# Patient Record
Sex: FEMALE | Race: OTHER | Employment: FULL TIME | URBAN - METROPOLITAN AREA
[De-identification: names, ages, dates, MRNs, and addresses within clinical notes are randomized per-mention and may not be internally consistent; named-entity substitution may affect disease eponyms.]

---

## 2018-02-21 ENCOUNTER — HOSPITAL ENCOUNTER (EMERGENCY)
Facility: HOSPITAL | Age: 28
Discharge: HOME | End: 2018-02-21
Payer: COMMERCIAL

## 2018-02-21 ENCOUNTER — APPOINTMENT (OUTPATIENT)
Dept: CT IMAGING | Facility: HOSPITAL | Age: 28
End: 2018-02-21
Attending: NURSE PRACTITIONER
Payer: COMMERCIAL

## 2018-02-21 ENCOUNTER — APPOINTMENT (OUTPATIENT)
Dept: GENERAL RADIOLOGY | Facility: HOSPITAL | Age: 28
End: 2018-02-21
Attending: NURSE PRACTITIONER
Payer: COMMERCIAL

## 2018-02-21 VITALS
HEIGHT: 63 IN | HEART RATE: 90 BPM | DIASTOLIC BLOOD PRESSURE: 70 MMHG | RESPIRATION RATE: 18 BRPM | OXYGEN SATURATION: 98 % | WEIGHT: 127 LBS | SYSTOLIC BLOOD PRESSURE: 122 MMHG | BODY MASS INDEX: 22.5 KG/M2 | TEMPERATURE: 98 F

## 2018-02-21 DIAGNOSIS — S39.012A BACK STRAIN, INITIAL ENCOUNTER: Primary | ICD-10-CM

## 2018-02-21 LAB
ANION GAP SERPL CALC-SCNC: 9 MMOL/L (ref 0–18)
B-HCG UR QL: NEGATIVE
BACTERIA UR QL AUTO: NEGATIVE /HPF
BASOPHILS # BLD: 0 K/UL (ref 0–0.2)
BASOPHILS NFR BLD: 0 %
BILIRUB UR QL: NEGATIVE
BUN SERPL-MCNC: 6 MG/DL (ref 8–20)
BUN/CREAT SERPL: 9.1 (ref 10–20)
CALCIUM SERPL-MCNC: 9.8 MG/DL (ref 8.5–10.5)
CHLORIDE SERPL-SCNC: 101 MMOL/L (ref 95–110)
CLARITY UR: CLEAR
CO2 SERPL-SCNC: 27 MMOL/L (ref 22–32)
COLOR UR: YELLOW
CREAT SERPL-MCNC: 0.66 MG/DL (ref 0.5–1.5)
D DIMER PPP FEU-MCNC: 0.35 MCG/ML (ref ?–0.5)
EOSINOPHIL # BLD: 0 K/UL (ref 0–0.7)
EOSINOPHIL NFR BLD: 0 %
ERYTHROCYTE [DISTWIDTH] IN BLOOD BY AUTOMATED COUNT: 13.1 % (ref 11–15)
GLUCOSE SERPL-MCNC: 100 MG/DL (ref 70–99)
GLUCOSE UR-MCNC: NEGATIVE MG/DL
HCT VFR BLD AUTO: 42.8 % (ref 35–48)
HGB BLD-MCNC: 14.4 G/DL (ref 12–16)
KETONES UR-MCNC: NEGATIVE MG/DL
LEUKOCYTE ESTERASE UR QL STRIP.AUTO: NEGATIVE
LYMPHOCYTES # BLD: 1.6 K/UL (ref 1–4)
LYMPHOCYTES NFR BLD: 12 %
MCH RBC QN AUTO: 30.6 PG (ref 27–32)
MCHC RBC AUTO-ENTMCNC: 33.8 G/DL (ref 32–37)
MCV RBC AUTO: 90.8 FL (ref 80–100)
MONOCYTES # BLD: 0.9 K/UL (ref 0–1)
MONOCYTES NFR BLD: 6 %
NEUTROPHILS # BLD AUTO: 11 K/UL (ref 1.8–7.7)
NEUTROPHILS NFR BLD: 82 %
NITRITE UR QL STRIP.AUTO: NEGATIVE
OSMOLALITY UR CALC.SUM OF ELEC: 282 MOSM/KG (ref 275–295)
PH UR: 7 [PH] (ref 5–8)
PLATELET # BLD AUTO: 228 K/UL (ref 140–400)
PMV BLD AUTO: 8.5 FL (ref 7.4–10.3)
POTASSIUM SERPL-SCNC: 3.3 MMOL/L (ref 3.3–5.1)
PROT UR-MCNC: NEGATIVE MG/DL
RBC # BLD AUTO: 4.71 M/UL (ref 3.7–5.4)
RBC #/AREA URNS AUTO: 61 /HPF
SODIUM SERPL-SCNC: 137 MMOL/L (ref 136–144)
SP GR UR STRIP: 1.02 (ref 1–1.03)
UROBILINOGEN UR STRIP-ACNC: <2
VIT C UR-MCNC: NEGATIVE MG/DL
WBC # BLD AUTO: 13.5 K/UL (ref 4–11)
WBC #/AREA URNS AUTO: 1 /HPF

## 2018-02-21 PROCEDURE — 85025 COMPLETE CBC W/AUTO DIFF WBC: CPT | Performed by: NURSE PRACTITIONER

## 2018-02-21 PROCEDURE — 81025 URINE PREGNANCY TEST: CPT

## 2018-02-21 PROCEDURE — 96374 THER/PROPH/DIAG INJ IV PUSH: CPT

## 2018-02-21 PROCEDURE — 93010 ELECTROCARDIOGRAM REPORT: CPT | Performed by: NURSE PRACTITIONER

## 2018-02-21 PROCEDURE — 85379 FIBRIN DEGRADATION QUANT: CPT | Performed by: NURSE PRACTITIONER

## 2018-02-21 PROCEDURE — 99285 EMERGENCY DEPT VISIT HI MDM: CPT

## 2018-02-21 PROCEDURE — 81001 URINALYSIS AUTO W/SCOPE: CPT

## 2018-02-21 PROCEDURE — 71046 X-RAY EXAM CHEST 2 VIEWS: CPT | Performed by: NURSE PRACTITIONER

## 2018-02-21 PROCEDURE — 74176 CT ABD & PELVIS W/O CONTRAST: CPT | Performed by: NURSE PRACTITIONER

## 2018-02-21 PROCEDURE — 93005 ELECTROCARDIOGRAM TRACING: CPT

## 2018-02-21 PROCEDURE — 96361 HYDRATE IV INFUSION ADD-ON: CPT

## 2018-02-21 PROCEDURE — 80048 BASIC METABOLIC PNL TOTAL CA: CPT | Performed by: NURSE PRACTITIONER

## 2018-02-21 RX ORDER — KETOROLAC TROMETHAMINE 30 MG/ML
30 INJECTION, SOLUTION INTRAMUSCULAR; INTRAVENOUS ONCE
Status: COMPLETED | OUTPATIENT
Start: 2018-02-21 | End: 2018-02-21

## 2018-02-21 RX ORDER — CYCLOBENZAPRINE HCL 10 MG
10 TABLET ORAL 3 TIMES DAILY PRN
Qty: 10 TABLET | Refills: 0 | Status: SHIPPED | OUTPATIENT
Start: 2018-02-21 | End: 2018-02-25

## 2018-02-21 NOTE — ED PROVIDER NOTES
Patient Seen in: [facility] AND [facility] Emergency Department    History   CC: flank pain  HPI: Byron Otero 28year old female  who presents to the ER c/o right-sided flank pain that was somewhat intermittent yesterday however constant and severe this mor (36.7 °C)   Resp 18   Ht 160 cm (5' 3\")   Wt 57.6 kg   SpO2 98%   BMI 22.50 kg/m²         General - Appears well and in NAD  Head - Appears symmetrical without deformity/swelling cranium, scalp, or facial bones  Resp - Lung sounds clear bilaterally and wo LAVENDER TALL (BNP)       MDM     Results discussed with patient as well as Dr. Mc Cardenas who also evaluated this patient. No effusion seen on x-ray. States that she is recovering from a recent cold however. Pain is exacerbated with some movement.   Will disc

## 2019-12-10 ENCOUNTER — LAB ENCOUNTER (OUTPATIENT)
Dept: LAB | Facility: HOSPITAL | Age: 29
End: 2019-12-10
Attending: FAMILY MEDICINE
Payer: COMMERCIAL

## 2019-12-10 DIAGNOSIS — R10.13 ABDOMINAL PAIN, EPIGASTRIC: ICD-10-CM

## 2019-12-10 DIAGNOSIS — N91.5 SCANTY OR INFREQUENT MENSTRUATION: Primary | ICD-10-CM

## 2019-12-10 PROCEDURE — 83001 ASSAY OF GONADOTROPIN (FSH): CPT

## 2019-12-10 PROCEDURE — 83002 ASSAY OF GONADOTROPIN (LH): CPT

## 2019-12-10 PROCEDURE — 36415 COLL VENOUS BLD VENIPUNCTURE: CPT

## 2019-12-10 PROCEDURE — 81001 URINALYSIS AUTO W/SCOPE: CPT

## 2019-12-10 PROCEDURE — 85025 COMPLETE CBC W/AUTO DIFF WBC: CPT

## 2019-12-10 PROCEDURE — 84703 CHORIONIC GONADOTROPIN ASSAY: CPT

## 2019-12-10 PROCEDURE — 84443 ASSAY THYROID STIM HORMONE: CPT

## 2019-12-10 PROCEDURE — 80053 COMPREHEN METABOLIC PANEL: CPT

## 2019-12-31 ENCOUNTER — HOSPITAL ENCOUNTER (OUTPATIENT)
Dept: ULTRASOUND IMAGING | Facility: HOSPITAL | Age: 29
Discharge: HOME | End: 2019-12-31
Attending: FAMILY MEDICINE
Payer: COMMERCIAL

## 2019-12-31 DIAGNOSIS — R31.21 ASYMPTOMATIC MICROSCOPIC HEMATURIA: ICD-10-CM

## 2019-12-31 PROCEDURE — 76770 US EXAM ABDO BACK WALL COMP: CPT | Performed by: FAMILY MEDICINE

## 2020-09-03 ENCOUNTER — LAB ENCOUNTER (OUTPATIENT)
Dept: LAB | Facility: HOSPITAL | Age: 30
End: 2020-09-03
Attending: FAMILY MEDICINE
Payer: COMMERCIAL

## 2020-09-03 DIAGNOSIS — Z20.828 EXPOSURE TO SARS-ASSOCIATED CORONAVIRUS: Primary | ICD-10-CM

## 2020-09-03 DIAGNOSIS — L65.9 BALDNESS: ICD-10-CM

## 2020-09-03 DIAGNOSIS — Z00.00 ROUTINE GENERAL MEDICAL EXAMINATION AT A HEALTH CARE FACILITY: ICD-10-CM

## 2020-09-03 LAB
ALBUMIN SERPL-MCNC: 3.7 G/DL (ref 3.4–5)
ALBUMIN/GLOB SERPL: 0.9 {RATIO} (ref 1–2)
ALP LIVER SERPL-CCNC: 113 U/L (ref 37–98)
ALT SERPL-CCNC: 19 U/L (ref 13–56)
ANION GAP SERPL CALC-SCNC: 4 MMOL/L (ref 0–18)
AST SERPL-CCNC: 17 U/L (ref 15–37)
BASOPHILS # BLD AUTO: 0.02 X10(3) UL (ref 0–0.2)
BASOPHILS NFR BLD AUTO: 0.4 %
BILIRUB SERPL-MCNC: 0.4 MG/DL (ref 0.1–2)
BUN BLD-MCNC: 7 MG/DL (ref 7–18)
BUN/CREAT SERPL: 11.1 (ref 10–20)
CALCIUM BLD-MCNC: 9.1 MG/DL (ref 8.5–10.1)
CHLORIDE SERPL-SCNC: 110 MMOL/L (ref 98–112)
CHOLEST SMN-MCNC: 174 MG/DL (ref ?–200)
CO2 SERPL-SCNC: 26 MMOL/L (ref 21–32)
CREAT BLD-MCNC: 0.63 MG/DL (ref 0.55–1.02)
DEPRECATED HBV CORE AB SER IA-ACNC: 44.3 NG/ML (ref 12–160)
DEPRECATED RDW RBC AUTO: 42.6 FL (ref 35.1–46.3)
EOSINOPHIL # BLD AUTO: 0.07 X10(3) UL (ref 0–0.7)
EOSINOPHIL NFR BLD AUTO: 1.4 %
ERYTHROCYTE [DISTWIDTH] IN BLOOD BY AUTOMATED COUNT: 13.1 % (ref 11–15)
GLOBULIN PLAS-MCNC: 4.1 G/DL (ref 2.8–4.4)
GLUCOSE BLD-MCNC: 82 MG/DL (ref 70–99)
HCT VFR BLD AUTO: 41.2 % (ref 35–48)
HDLC SERPL-MCNC: 53 MG/DL (ref 40–59)
HGB BLD-MCNC: 13.8 G/DL (ref 12–16)
IMM GRANULOCYTES # BLD AUTO: 0.01 X10(3) UL (ref 0–1)
IMM GRANULOCYTES NFR BLD: 0.2 %
IRON SATURATION: 21 % (ref 15–50)
IRON SERPL-MCNC: 94 UG/DL (ref 50–170)
LDLC SERPL CALC-MCNC: 99 MG/DL (ref ?–100)
LYMPHOCYTES # BLD AUTO: 1.93 X10(3) UL (ref 1–4)
LYMPHOCYTES NFR BLD AUTO: 37.5 %
M PROTEIN MFR SERPL ELPH: 7.8 G/DL (ref 6.4–8.2)
MCH RBC QN AUTO: 29.8 PG (ref 26–34)
MCHC RBC AUTO-ENTMCNC: 33.5 G/DL (ref 31–37)
MCV RBC AUTO: 89 FL (ref 80–100)
MONOCYTES # BLD AUTO: 0.41 X10(3) UL (ref 0.1–1)
MONOCYTES NFR BLD AUTO: 8 %
NEUTROPHILS # BLD AUTO: 2.7 X10 (3) UL (ref 1.5–7.7)
NEUTROPHILS # BLD AUTO: 2.7 X10(3) UL (ref 1.5–7.7)
NEUTROPHILS NFR BLD AUTO: 52.5 %
NONHDLC SERPL-MCNC: 121 MG/DL (ref ?–130)
OSMOLALITY SERPL CALC.SUM OF ELEC: 287 MOSM/KG (ref 275–295)
PATIENT FASTING Y/N/NP: YES
PATIENT FASTING Y/N/NP: YES
PLATELET # BLD AUTO: 219 10(3)UL (ref 150–450)
POTASSIUM SERPL-SCNC: 3.9 MMOL/L (ref 3.5–5.1)
RBC # BLD AUTO: 4.63 X10(6)UL (ref 3.8–5.3)
SARS-COV-2 IGG SERPLBLD QL IA.RAPID: NEGATIVE
SODIUM SERPL-SCNC: 140 MMOL/L (ref 136–145)
TOTAL IRON BINDING CAPACITY: 448 UG/DL (ref 240–450)
TRANSFERRIN SERPL-MCNC: 301 MG/DL (ref 200–360)
TRIGL SERPL-MCNC: 108 MG/DL (ref 30–149)
TSI SER-ACNC: 1.91 MIU/ML (ref 0.36–3.74)
VLDLC SERPL CALC-MCNC: 22 MG/DL (ref 0–30)
WBC # BLD AUTO: 5.1 X10(3) UL (ref 4–11)

## 2020-09-03 PROCEDURE — 82728 ASSAY OF FERRITIN: CPT

## 2020-09-03 PROCEDURE — 83540 ASSAY OF IRON: CPT

## 2020-09-03 PROCEDURE — 85025 COMPLETE CBC W/AUTO DIFF WBC: CPT

## 2020-09-03 PROCEDURE — 36415 COLL VENOUS BLD VENIPUNCTURE: CPT

## 2020-09-03 PROCEDURE — 84402 ASSAY OF FREE TESTOSTERONE: CPT

## 2020-09-03 PROCEDURE — 84443 ASSAY THYROID STIM HORMONE: CPT

## 2020-09-03 PROCEDURE — 86769 SARS-COV-2 COVID-19 ANTIBODY: CPT

## 2020-09-03 PROCEDURE — 80061 LIPID PANEL: CPT

## 2020-09-03 PROCEDURE — 80053 COMPREHEN METABOLIC PANEL: CPT

## 2020-09-03 PROCEDURE — 84403 ASSAY OF TOTAL TESTOSTERONE: CPT

## 2020-09-03 PROCEDURE — 84466 ASSAY OF TRANSFERRIN: CPT

## 2020-09-05 LAB
TESTOSTERONE, FREE, S: 0.88 NG/DL
TESTOSTERONE, TOTAL, S: 42 NG/DL

## 2021-04-30 ENCOUNTER — OFFICE VISIT (OUTPATIENT)
Dept: SURGERY | Facility: CLINIC | Age: 31
End: 2021-04-30
Payer: COMMERCIAL

## 2021-04-30 DIAGNOSIS — M95.0 NASAL DEFORMITY: Primary | ICD-10-CM

## 2021-04-30 DIAGNOSIS — J34.89 NASAL OBSTRUCTION: ICD-10-CM

## 2021-04-30 PROCEDURE — 99243 OFF/OP CNSLTJ NEW/EST LOW 30: CPT | Performed by: SURGERY

## 2021-04-30 NOTE — CONSULTS
New Patient Consultation    Chief Complaint:  No chief complaint on file. Nasal obstruction nasal deformity    History of Present Illness:   Byorn Otero is a 31year old female with difficulty breathing.  She had a h o nasal trauma several years ago hoarseness, sore throat, or swollen glands  Respiratory:   The patient denies shortness of breath, cough, bloody cough, phlegm, asthma, or wheezing  Cardiovascular:   The patient denies chest pain/pressure, palpitations, irregular heartbeat, high blood pres oriented and well-developed. Neurologic: Speech patterns and movements are normal.     Psychiatric: Affect is appropriate. Eyes: Conjunctiva are clear, non-icteric.  PERRL    ENT: no obvious abnormality, no ear drainage, mucous membranes moist and pi nasal deformity, , risk of hematoma, seroma, foreign body reaction, allergic reaction, wound dehiscences, delayed wound healing, need for further surgery.       45 minutes were spent with the patient, from which 35 minutes were spent counseling the patient

## 2021-05-04 ENCOUNTER — OFFICE VISIT (OUTPATIENT)
Dept: AUDIOLOGY | Facility: CLINIC | Age: 31
End: 2021-05-04
Payer: COMMERCIAL

## 2021-05-04 ENCOUNTER — OFFICE VISIT (OUTPATIENT)
Dept: OTOLARYNGOLOGY | Facility: CLINIC | Age: 31
End: 2021-05-04
Payer: COMMERCIAL

## 2021-05-04 VITALS
HEART RATE: 112 BPM | HEIGHT: 63 IN | TEMPERATURE: 96 F | WEIGHT: 135 LBS | BODY MASS INDEX: 23.92 KG/M2 | DIASTOLIC BLOOD PRESSURE: 83 MMHG | SYSTOLIC BLOOD PRESSURE: 125 MMHG

## 2021-05-04 DIAGNOSIS — H93.19 TINNITUS, UNSPECIFIED LATERALITY: Primary | ICD-10-CM

## 2021-05-04 PROCEDURE — 92557 COMPREHENSIVE HEARING TEST: CPT | Performed by: AUDIOLOGIST

## 2021-05-04 PROCEDURE — 99203 OFFICE O/P NEW LOW 30 MIN: CPT | Performed by: OTOLARYNGOLOGY

## 2021-05-04 PROCEDURE — 3008F BODY MASS INDEX DOCD: CPT | Performed by: OTOLARYNGOLOGY

## 2021-05-04 PROCEDURE — 3079F DIAST BP 80-89 MM HG: CPT | Performed by: OTOLARYNGOLOGY

## 2021-05-04 PROCEDURE — 3074F SYST BP LT 130 MM HG: CPT | Performed by: OTOLARYNGOLOGY

## 2021-05-04 PROCEDURE — 92567 TYMPANOMETRY: CPT | Performed by: AUDIOLOGIST

## 2021-05-04 RX ORDER — MONTELUKAST SODIUM 10 MG/1
10 TABLET ORAL NIGHTLY
Qty: 30 TABLET | Refills: 3 | Status: SHIPPED | OUTPATIENT
Start: 2021-05-04 | End: 2021-09-13

## 2021-05-04 RX ORDER — AZELASTINE 1 MG/ML
2 SPRAY, METERED NASAL 2 TIMES DAILY
Qty: 1 BOTTLE | Refills: 0 | Status: SHIPPED | OUTPATIENT
Start: 2021-05-04 | End: 2021-09-13

## 2021-05-04 RX ORDER — LORATADINE 10 MG/1
10 TABLET ORAL DAILY
Qty: 30 TABLET | Refills: 3 | Status: SHIPPED | OUTPATIENT
Start: 2021-05-04 | End: 2021-09-13

## 2021-05-04 NOTE — PROGRESS NOTES
Lynne Guerrero is a 31year old female.   Patient presents with:  Sinus Problem: sinus pressure and pain , over the bridge of nose, behind eye, per inury to nose 10 years ago   Ear Problem: riging in ears 2-3 times a weeks, per pt when she eats spicy food Cuff Size: large)   Pulse 112   Temp (!) 96.3 °F (35.7 °C) (Tympanic)   Ht 5' 3\" (1.6 m)   Wt 135 lb (61.2 kg)   BMI 23.91 kg/m²        Constitutional Normal Overall appearance - Normal.   Psychiatric Normal Orientation - Oriented to time, place, person & still exist    Sharath Araujo MD    5/4/2021    1:32 PM

## 2021-05-05 NOTE — PROGRESS NOTES
AUDIOGRAM     Nai Chu was referred for testing by Philmore Dakin for bilateral tinnitus. 9/12/1990  [id number]      Otoscopic inspection: right ear no cerumen; left ear no cerumen.        Tests/Procedures  Patient was tested via  standard insert e

## 2021-09-13 ENCOUNTER — OFFICE VISIT (OUTPATIENT)
Dept: FAMILY MEDICINE CLINIC | Facility: CLINIC | Age: 31
End: 2021-09-13
Payer: COMMERCIAL

## 2021-09-13 ENCOUNTER — LAB ENCOUNTER (OUTPATIENT)
Dept: LAB | Age: 31
End: 2021-09-13
Attending: FAMILY MEDICINE
Payer: COMMERCIAL

## 2021-09-13 VITALS
WEIGHT: 138 LBS | HEIGHT: 63 IN | HEART RATE: 71 BPM | DIASTOLIC BLOOD PRESSURE: 76 MMHG | BODY MASS INDEX: 24.45 KG/M2 | SYSTOLIC BLOOD PRESSURE: 108 MMHG

## 2021-09-13 DIAGNOSIS — Z13.0 SCREENING FOR DEFICIENCY ANEMIA: ICD-10-CM

## 2021-09-13 DIAGNOSIS — Z00.00 WELLNESS EXAMINATION: Primary | ICD-10-CM

## 2021-09-13 DIAGNOSIS — J34.2 NASAL SEPTAL DEVIATION: ICD-10-CM

## 2021-09-13 DIAGNOSIS — Z13.29 THYROID DISORDER SCREEN: ICD-10-CM

## 2021-09-13 DIAGNOSIS — Z00.00 WELLNESS EXAMINATION: ICD-10-CM

## 2021-09-13 DIAGNOSIS — R53.82 CHRONIC FATIGUE: ICD-10-CM

## 2021-09-13 DIAGNOSIS — G47.9 SLEEP DISORDER: ICD-10-CM

## 2021-09-13 LAB
ALBUMIN SERPL-MCNC: 4.3 G/DL (ref 3.4–5)
ALBUMIN/GLOB SERPL: 1.2 {RATIO} (ref 1–2)
ALP LIVER SERPL-CCNC: 111 U/L
ALT SERPL-CCNC: 22 U/L
ANION GAP SERPL CALC-SCNC: 6 MMOL/L (ref 0–18)
AST SERPL-CCNC: 15 U/L (ref 15–37)
BASOPHILS # BLD AUTO: 0.03 X10(3) UL (ref 0–0.2)
BASOPHILS NFR BLD AUTO: 0.5 %
BILIRUB SERPL-MCNC: 0.2 MG/DL (ref 0.1–2)
BUN BLD-MCNC: 8 MG/DL (ref 7–18)
BUN/CREAT SERPL: 12.3 (ref 10–20)
CALCIUM BLD-MCNC: 9.3 MG/DL (ref 8.5–10.1)
CHLORIDE SERPL-SCNC: 104 MMOL/L (ref 98–112)
CO2 SERPL-SCNC: 29 MMOL/L (ref 21–32)
CREAT BLD-MCNC: 0.65 MG/DL
DEPRECATED RDW RBC AUTO: 42.7 FL (ref 35.1–46.3)
EOSINOPHIL # BLD AUTO: 0.08 X10(3) UL (ref 0–0.7)
EOSINOPHIL NFR BLD AUTO: 1.3 %
ERYTHROCYTE [DISTWIDTH] IN BLOOD BY AUTOMATED COUNT: 12.7 % (ref 11–15)
GLOBULIN PLAS-MCNC: 3.6 G/DL (ref 2.8–4.4)
GLUCOSE BLD-MCNC: 90 MG/DL (ref 70–99)
HCT VFR BLD AUTO: 40 %
HGB BLD-MCNC: 13.1 G/DL
IMM GRANULOCYTES # BLD AUTO: 0.02 X10(3) UL (ref 0–1)
IMM GRANULOCYTES NFR BLD: 0.3 %
LYMPHOCYTES # BLD AUTO: 2.25 X10(3) UL (ref 1–4)
LYMPHOCYTES NFR BLD AUTO: 35.9 %
M PROTEIN MFR SERPL ELPH: 7.9 G/DL (ref 6.4–8.2)
MCH RBC QN AUTO: 30 PG (ref 26–34)
MCHC RBC AUTO-ENTMCNC: 32.8 G/DL (ref 31–37)
MCV RBC AUTO: 91.7 FL
MONOCYTES # BLD AUTO: 0.48 X10(3) UL (ref 0.1–1)
MONOCYTES NFR BLD AUTO: 7.7 %
NEUTROPHILS # BLD AUTO: 3.41 X10 (3) UL (ref 1.5–7.7)
NEUTROPHILS # BLD AUTO: 3.41 X10(3) UL (ref 1.5–7.7)
NEUTROPHILS NFR BLD AUTO: 54.3 %
OSMOLALITY SERPL CALC.SUM OF ELEC: 286 MOSM/KG (ref 275–295)
PATIENT FASTING Y/N/NP: NO
PLATELET # BLD AUTO: 235 10(3)UL (ref 150–450)
POTASSIUM SERPL-SCNC: 3.7 MMOL/L (ref 3.5–5.1)
RBC # BLD AUTO: 4.36 X10(6)UL
SODIUM SERPL-SCNC: 139 MMOL/L (ref 136–145)
TSI SER-ACNC: 2.25 MIU/ML (ref 0.36–3.74)
WBC # BLD AUTO: 6.3 X10(3) UL (ref 4–11)

## 2021-09-13 PROCEDURE — 84443 ASSAY THYROID STIM HORMONE: CPT

## 2021-09-13 PROCEDURE — 3008F BODY MASS INDEX DOCD: CPT | Performed by: FAMILY MEDICINE

## 2021-09-13 PROCEDURE — 80053 COMPREHEN METABOLIC PANEL: CPT

## 2021-09-13 PROCEDURE — 85025 COMPLETE CBC W/AUTO DIFF WBC: CPT

## 2021-09-13 PROCEDURE — 99385 PREV VISIT NEW AGE 18-39: CPT | Performed by: FAMILY MEDICINE

## 2021-09-13 PROCEDURE — 3078F DIAST BP <80 MM HG: CPT | Performed by: FAMILY MEDICINE

## 2021-09-13 PROCEDURE — 36415 COLL VENOUS BLD VENIPUNCTURE: CPT

## 2021-09-13 PROCEDURE — 3074F SYST BP LT 130 MM HG: CPT | Performed by: FAMILY MEDICINE

## 2021-09-13 NOTE — PROGRESS NOTES
HPI:   Obed Smith is a 27year old female who presents for an Annual Health Visit.    Patient reports that over the last few years she has been having continued fatigue, she reports that she sleeps around 8 hours per night and she wakes up feeling f Pulmonary effort is normal.      Breath sounds: Normal breath sounds. Chest:   Breasts: Breasts are symmetrical.      Right: No swelling, inverted nipple, mass, nipple discharge, skin change, tenderness, axillary adenopathy or supraclavicular adenopathy. goal is to find a disease early so lifestyle changes can be made and you can be watched more closely to reduce the risk of disease, or to detect it early enough to treat it most effectively.  Screening tests are not considered diagnostic, but are used to de routine exams   Tuberculosis Women at increased risk for infection should talk with their healthcare provider  Ask your healthcare provider    Vision All women in this age group  At least 1 complete exam in your 25s, and 2 in your 30s    Vaccine Who needs this age group  Td every 10 years, or a one-time dose of Tdap instead of a Td booster after age 25, then Td every 10 years    Counseling Who needs it How often   BRCA gene mutation testing for breast and ovarian cancer susceptibility  Women with increased Active Problem List:     Nasal obstruction     Nasal deformity      Chanelle Spencer.  Andrés Zavala MD  9/13/2021  4:27 PM

## 2021-10-04 ENCOUNTER — OFFICE VISIT (OUTPATIENT)
Dept: FAMILY MEDICINE CLINIC | Facility: CLINIC | Age: 31
End: 2021-10-04
Payer: COMMERCIAL

## 2021-10-04 VITALS
SYSTOLIC BLOOD PRESSURE: 105 MMHG | DIASTOLIC BLOOD PRESSURE: 68 MMHG | HEIGHT: 63 IN | WEIGHT: 138.13 LBS | RESPIRATION RATE: 17 BRPM | BODY MASS INDEX: 24.47 KG/M2 | HEART RATE: 87 BPM

## 2021-10-04 DIAGNOSIS — L68.0 HIRSUTISM: Primary | ICD-10-CM

## 2021-10-04 DIAGNOSIS — L83 ACANTHOSIS NIGRICANS: ICD-10-CM

## 2021-10-04 DIAGNOSIS — R53.83 FATIGUE, UNSPECIFIED TYPE: ICD-10-CM

## 2021-10-04 DIAGNOSIS — N76.0 ACUTE VAGINITIS: ICD-10-CM

## 2021-10-04 DIAGNOSIS — N91.1 SECONDARY AMENORRHEA: ICD-10-CM

## 2021-10-04 PROCEDURE — 3008F BODY MASS INDEX DOCD: CPT | Performed by: FAMILY MEDICINE

## 2021-10-04 PROCEDURE — 99214 OFFICE O/P EST MOD 30 MIN: CPT | Performed by: FAMILY MEDICINE

## 2021-10-04 PROCEDURE — 3078F DIAST BP <80 MM HG: CPT | Performed by: FAMILY MEDICINE

## 2021-10-04 PROCEDURE — 3074F SYST BP LT 130 MM HG: CPT | Performed by: FAMILY MEDICINE

## 2021-10-04 NOTE — PROGRESS NOTES
Balaji Reid is a 27year old female.   HPI:   Patient is here to follow-up on results, she reports that she has been having vaginal irritation for the last few days, she has used over-the-counter pills externally with no improvement and is spreading of TESTOSTERONE,FREE AND TOTAL; Future  - 17-ALPHA-HYDROXYPROGESTERONE; Future  - HCG, BETA SUBUNIT (QUANT PREGNANCY TEST); Future  - PROLACTIN; Future  - FSH; Future  - ESTRADIOL; Future    2.  Secondary amenorrhea  Unclear cause of amenorrhea at this time, o

## 2021-10-07 ENCOUNTER — LAB ENCOUNTER (OUTPATIENT)
Dept: LAB | Age: 31
End: 2021-10-07
Attending: FAMILY MEDICINE
Payer: COMMERCIAL

## 2021-10-07 ENCOUNTER — PATIENT MESSAGE (OUTPATIENT)
Dept: FAMILY MEDICINE CLINIC | Facility: CLINIC | Age: 31
End: 2021-10-07

## 2021-10-07 DIAGNOSIS — N91.1 SECONDARY AMENORRHEA: ICD-10-CM

## 2021-10-07 DIAGNOSIS — L68.0 HIRSUTISM: ICD-10-CM

## 2021-10-07 DIAGNOSIS — R53.83 FATIGUE, UNSPECIFIED TYPE: ICD-10-CM

## 2021-10-07 DIAGNOSIS — L83 ACANTHOSIS NIGRICANS: ICD-10-CM

## 2021-10-07 PROCEDURE — 83525 ASSAY OF INSULIN: CPT

## 2021-10-07 PROCEDURE — 83036 HEMOGLOBIN GLYCOSYLATED A1C: CPT

## 2021-10-07 PROCEDURE — 84402 ASSAY OF FREE TESTOSTERONE: CPT

## 2021-10-07 PROCEDURE — 84403 ASSAY OF TOTAL TESTOSTERONE: CPT

## 2021-10-07 PROCEDURE — 82306 VITAMIN D 25 HYDROXY: CPT

## 2021-10-07 PROCEDURE — 83001 ASSAY OF GONADOTROPIN (FSH): CPT

## 2021-10-07 PROCEDURE — 84146 ASSAY OF PROLACTIN: CPT

## 2021-10-07 PROCEDURE — 83498 ASY HYDROXYPROGESTERONE 17-D: CPT

## 2021-10-07 PROCEDURE — 84702 CHORIONIC GONADOTROPIN TEST: CPT

## 2021-10-07 PROCEDURE — 82670 ASSAY OF TOTAL ESTRADIOL: CPT

## 2021-10-07 PROCEDURE — 36415 COLL VENOUS BLD VENIPUNCTURE: CPT

## 2021-10-07 PROCEDURE — 82947 ASSAY GLUCOSE BLOOD QUANT: CPT

## 2021-10-08 ENCOUNTER — PATIENT MESSAGE (OUTPATIENT)
Dept: FAMILY MEDICINE CLINIC | Facility: CLINIC | Age: 31
End: 2021-10-08

## 2021-10-08 RX ORDER — METRONIDAZOLE 7.5 MG/G
1 GEL VAGINAL NIGHTLY
Qty: 70 G | Refills: 0 | Status: SHIPPED | OUTPATIENT
Start: 2021-10-08 | End: 2021-10-13

## 2021-10-08 NOTE — TELEPHONE ENCOUNTER
Routed to Dr Kecia Perez for advise, thanks.       Future Appointments   Date Time Provider Massimo Delatorre   10/28/2021  2:10 PM DO RENNY [facility] DAYANARA GARDNER

## 2021-10-08 NOTE — TELEPHONE ENCOUNTER
From: Rk Montejo  To: Julieth Levine MD  Sent: 10/7/2021 12:44 PM CDT  Subject: Question regarding GENITAL VAGINOSIS SCREEN    Is there an over the counter medication that I can use?

## 2021-10-09 PROBLEM — E55.9 VITAMIN D DEFICIENCY: Status: ACTIVE | Noted: 2021-10-09

## 2021-10-21 PROBLEM — E28.2 PCOS (POLYCYSTIC OVARIAN SYNDROME): Status: ACTIVE | Noted: 2021-10-21

## 2021-10-28 ENCOUNTER — OFFICE VISIT (OUTPATIENT)
Dept: PULMONOLOGY | Facility: CLINIC | Age: 31
End: 2021-10-28
Payer: COMMERCIAL

## 2021-10-28 VITALS
DIASTOLIC BLOOD PRESSURE: 82 MMHG | BODY MASS INDEX: 25.12 KG/M2 | WEIGHT: 141.81 LBS | OXYGEN SATURATION: 97 % | HEART RATE: 79 BPM | HEIGHT: 63 IN | SYSTOLIC BLOOD PRESSURE: 120 MMHG

## 2021-10-28 DIAGNOSIS — G47.10 HYPERSOMNIA: Primary | ICD-10-CM

## 2021-10-28 PROCEDURE — 99244 OFF/OP CNSLTJ NEW/EST MOD 40: CPT | Performed by: INTERNAL MEDICINE

## 2021-10-28 PROCEDURE — 3079F DIAST BP 80-89 MM HG: CPT | Performed by: INTERNAL MEDICINE

## 2021-10-28 PROCEDURE — 3074F SYST BP LT 130 MM HG: CPT | Performed by: INTERNAL MEDICINE

## 2021-10-28 PROCEDURE — 3008F BODY MASS INDEX DOCD: CPT | Performed by: INTERNAL MEDICINE

## 2021-10-28 NOTE — H&P
Referring Physician  Lamin De La Torre. Taurus Ramsey MD    Chief Complaint  Hypersomnia    History of Present Illness  Patient is a 27-year-old female who presents to pulmonary clinic for initial visit. Admits to longstanding history of hypersomnia and fatigue. lymphadenopathy     Imaging  Chest x-ray on 2/21/2018 with no significant abnormalities    Pulmonary Function Testing  None available to review    Assessment  1. Hypersomnia    Plan  -Patient presents today for management of underlying hypersomnia.   I do

## 2021-11-18 ENCOUNTER — TELEPHONE (OUTPATIENT)
Dept: PULMONOLOGY | Facility: CLINIC | Age: 31
End: 2021-11-18

## 2021-11-18 DIAGNOSIS — G47.10 HYPERSOMNIA: Primary | ICD-10-CM

## 2021-11-18 NOTE — TELEPHONE ENCOUNTER
Prior auth for sleep study was denied by pts insur. ,  is able to order a home sleep study test, or sched a peer to peer at [phone]. Case # [id number]. Pt. Has upcoming appt. sched for 11/19/2021.

## 2021-11-19 NOTE — TELEPHONE ENCOUNTER
Order for home sleep study was placed per Dr. Dameon López. [facility] is aware of the previous. Explained unable to cancel order for diagnostic PSG since pt currently scheduled for home sleep study. She had no further concerns/questions at this time.

## 2021-11-22 ENCOUNTER — LAB ENCOUNTER (OUTPATIENT)
Dept: LAB | Age: 31
End: 2021-11-22
Attending: FAMILY MEDICINE
Payer: COMMERCIAL

## 2021-11-22 ENCOUNTER — OFFICE VISIT (OUTPATIENT)
Dept: FAMILY MEDICINE CLINIC | Facility: CLINIC | Age: 31
End: 2021-11-22
Payer: COMMERCIAL

## 2021-11-22 VITALS
RESPIRATION RATE: 17 BRPM | HEIGHT: 63 IN | WEIGHT: 141.81 LBS | SYSTOLIC BLOOD PRESSURE: 104 MMHG | HEART RATE: 93 BPM | DIASTOLIC BLOOD PRESSURE: 68 MMHG | BODY MASS INDEX: 25.12 KG/M2

## 2021-11-22 DIAGNOSIS — E28.2 PCOS (POLYCYSTIC OVARIAN SYNDROME): ICD-10-CM

## 2021-11-22 DIAGNOSIS — Z23 NEEDS FLU SHOT: ICD-10-CM

## 2021-11-22 DIAGNOSIS — E88.81 INSULIN RESISTANCE: ICD-10-CM

## 2021-11-22 DIAGNOSIS — E55.9 VITAMIN D DEFICIENCY: ICD-10-CM

## 2021-11-22 DIAGNOSIS — E55.9 VITAMIN D DEFICIENCY: Primary | ICD-10-CM

## 2021-11-22 DIAGNOSIS — L98.8 SKIN LESION OF BREAST: ICD-10-CM

## 2021-11-22 DIAGNOSIS — N63.0 BREAST MASS: ICD-10-CM

## 2021-11-22 PROCEDURE — 3078F DIAST BP <80 MM HG: CPT | Performed by: FAMILY MEDICINE

## 2021-11-22 PROCEDURE — 82306 VITAMIN D 25 HYDROXY: CPT

## 2021-11-22 PROCEDURE — 99214 OFFICE O/P EST MOD 30 MIN: CPT | Performed by: FAMILY MEDICINE

## 2021-11-22 PROCEDURE — 90471 IMMUNIZATION ADMIN: CPT | Performed by: FAMILY MEDICINE

## 2021-11-22 PROCEDURE — 90686 IIV4 VACC NO PRSV 0.5 ML IM: CPT | Performed by: FAMILY MEDICINE

## 2021-11-22 PROCEDURE — 3074F SYST BP LT 130 MM HG: CPT | Performed by: FAMILY MEDICINE

## 2021-11-22 PROCEDURE — 36415 COLL VENOUS BLD VENIPUNCTURE: CPT

## 2021-11-22 PROCEDURE — 3008F BODY MASS INDEX DOCD: CPT | Performed by: FAMILY MEDICINE

## 2021-11-22 NOTE — PROGRESS NOTES
Beth Beck is a 27year old female.   HPI:   Patient is here to follow-up in last visit, she has questions regarding the new diagnosis of insulin resistance, she also reports that over the last few months she has been noticing lesions in breast, she i With a start Metformin at this time, we discussed importance of dietary modification, decrease intake of starchy carbohydrates and increase intake of nonstarchy carbohydrates, printed information provided, we will monitor patient response.   Given to edgardo

## 2021-11-22 NOTE — PATIENT INSTRUCTIONS
Incorporate daily servings of the fruit and vegetables below, focus on incorporating at least 2 cups of these vegetables daily

## 2021-12-09 ENCOUNTER — HOSPITAL ENCOUNTER (OUTPATIENT)
Dept: MAMMOGRAPHY | Facility: HOSPITAL | Age: 31
Discharge: HOME | End: 2021-12-09
Attending: FAMILY MEDICINE
Payer: COMMERCIAL

## 2021-12-09 DIAGNOSIS — N63.0 BREAST MASS: ICD-10-CM

## 2021-12-09 DIAGNOSIS — L98.8 SKIN LESION OF BREAST: ICD-10-CM

## 2021-12-09 PROCEDURE — 77066 DX MAMMO INCL CAD BI: CPT | Performed by: FAMILY MEDICINE

## 2021-12-09 PROCEDURE — 77062 BREAST TOMOSYNTHESIS BI: CPT | Performed by: FAMILY MEDICINE

## 2022-01-31 ENCOUNTER — OFFICE VISIT (OUTPATIENT)
Dept: SLEEP CENTER | Age: 32
End: 2022-01-31
Attending: INTERNAL MEDICINE
Payer: COMMERCIAL

## 2022-01-31 DIAGNOSIS — G47.10 HYPERSOMNIA: ICD-10-CM

## 2022-01-31 PROCEDURE — 95806 SLEEP STUDY UNATT&RESP EFFT: CPT

## 2022-02-04 NOTE — PROCEDURES
[facility]  Accredited by the [employer] ([employer])    PATIENT'S NAME: Santy Wilks   ATTENDING PHYSICIAN: Long Ibarra DO   REFERRING PHYSICIAN: Camden Neely.  Omar Davis MD   PATIENT ACCOUNT #: [id number] LO RECOMMENDATIONS:    1.   Evaluate for etiologies of patient's excessive sleepiness. 2.   Screen for hypothyroidism, if not already performed. 3.   Increase total sleep time by 1 to 2 hours to evaluate for resolution of daytime sleepiness.   4.   Allow

## 2022-04-21 ENCOUNTER — OFFICE VISIT (OUTPATIENT)
Dept: FAMILY MEDICINE CLINIC | Facility: CLINIC | Age: 32
End: 2022-04-21
Payer: COMMERCIAL

## 2022-04-21 VITALS
HEIGHT: 63 IN | HEART RATE: 86 BPM | BODY MASS INDEX: 26.05 KG/M2 | WEIGHT: 147 LBS | SYSTOLIC BLOOD PRESSURE: 106 MMHG | DIASTOLIC BLOOD PRESSURE: 75 MMHG

## 2022-04-21 DIAGNOSIS — J01.00 ACUTE NON-RECURRENT MAXILLARY SINUSITIS: Primary | ICD-10-CM

## 2022-04-21 DIAGNOSIS — J02.9 SORE THROAT: ICD-10-CM

## 2022-04-21 LAB
CONTROL LINE PRESENT WITH A CLEAR BACKGROUND (YES/NO): YES YES/NO
KIT LOT #: NORMAL NUMERIC
STREP GRP A CUL-SCR: NEGATIVE

## 2022-04-21 PROCEDURE — 3008F BODY MASS INDEX DOCD: CPT | Performed by: PHYSICIAN ASSISTANT

## 2022-04-21 PROCEDURE — 99213 OFFICE O/P EST LOW 20 MIN: CPT | Performed by: PHYSICIAN ASSISTANT

## 2022-04-21 PROCEDURE — 3078F DIAST BP <80 MM HG: CPT | Performed by: PHYSICIAN ASSISTANT

## 2022-04-21 PROCEDURE — 87880 STREP A ASSAY W/OPTIC: CPT | Performed by: PHYSICIAN ASSISTANT

## 2022-04-21 PROCEDURE — 3074F SYST BP LT 130 MM HG: CPT | Performed by: PHYSICIAN ASSISTANT

## 2022-04-21 RX ORDER — AMOXICILLIN 875 MG/1
875 TABLET, COATED ORAL 2 TIMES DAILY
Qty: 20 TABLET | Refills: 0 | Status: SHIPPED | OUTPATIENT
Start: 2022-04-21 | End: 2022-05-01

## 2024-07-13 ENCOUNTER — LAB ENCOUNTER (OUTPATIENT)
Dept: LAB | Age: 34
End: 2024-07-13
Attending: INTERNAL MEDICINE
Payer: COMMERCIAL

## 2024-07-13 ENCOUNTER — OFFICE VISIT (OUTPATIENT)
Dept: INTERNAL MEDICINE CLINIC | Facility: CLINIC | Age: 34
End: 2024-07-13
Payer: COMMERCIAL

## 2024-07-13 VITALS
BODY MASS INDEX: 27.29 KG/M2 | HEART RATE: 86 BPM | RESPIRATION RATE: 16 BRPM | HEIGHT: 63 IN | SYSTOLIC BLOOD PRESSURE: 109 MMHG | TEMPERATURE: 98 F | DIASTOLIC BLOOD PRESSURE: 78 MMHG | WEIGHT: 154 LBS | OXYGEN SATURATION: 98 %

## 2024-07-13 DIAGNOSIS — E88.819 INSULIN RESISTANCE: ICD-10-CM

## 2024-07-13 DIAGNOSIS — Z00.00 ANNUAL PHYSICAL EXAM: Primary | ICD-10-CM

## 2024-07-13 DIAGNOSIS — Z00.00 ANNUAL PHYSICAL EXAM: ICD-10-CM

## 2024-07-13 LAB
ALBUMIN SERPL-MCNC: 5 G/DL (ref 3.2–4.8)
ALBUMIN/GLOB SERPL: 1.8 {RATIO} (ref 1–2)
ALP LIVER SERPL-CCNC: 101 U/L
ALT SERPL-CCNC: 25 U/L
ANION GAP SERPL CALC-SCNC: 8 MMOL/L (ref 0–18)
AST SERPL-CCNC: 21 U/L (ref ?–34)
BASOPHILS # BLD AUTO: 0.03 X10(3) UL (ref 0–0.2)
BASOPHILS NFR BLD AUTO: 0.6 %
BILIRUB SERPL-MCNC: 0.6 MG/DL (ref 0.3–1.2)
BUN BLD-MCNC: 8 MG/DL (ref 9–23)
BUN/CREAT SERPL: 11.4 (ref 10–20)
CALCIUM BLD-MCNC: 9.6 MG/DL (ref 8.7–10.4)
CHLORIDE SERPL-SCNC: 106 MMOL/L (ref 98–112)
CHOLEST SERPL-MCNC: 232 MG/DL (ref ?–200)
CO2 SERPL-SCNC: 26 MMOL/L (ref 21–32)
CREAT BLD-MCNC: 0.7 MG/DL
DEPRECATED RDW RBC AUTO: 41.1 FL (ref 35.1–46.3)
EGFRCR SERPLBLD CKD-EPI 2021: 116 ML/MIN/1.73M2 (ref 60–?)
EOSINOPHIL # BLD AUTO: 0.1 X10(3) UL (ref 0–0.7)
EOSINOPHIL NFR BLD AUTO: 1.9 %
ERYTHROCYTE [DISTWIDTH] IN BLOOD BY AUTOMATED COUNT: 12.8 % (ref 11–15)
EST. AVERAGE GLUCOSE BLD GHB EST-MCNC: 103 MG/DL (ref 68–126)
FASTING PATIENT LIPID ANSWER: YES
FASTING STATUS PATIENT QL REPORTED: YES
GLOBULIN PLAS-MCNC: 2.8 G/DL (ref 2–3.5)
GLUCOSE BLD-MCNC: 89 MG/DL (ref 70–99)
HBA1C MFR BLD: 5.2 % (ref ?–5.7)
HCT VFR BLD AUTO: 42.4 %
HDLC SERPL-MCNC: 65 MG/DL (ref 40–59)
HGB BLD-MCNC: 14.3 G/DL
IMM GRANULOCYTES # BLD AUTO: 0.01 X10(3) UL (ref 0–1)
IMM GRANULOCYTES NFR BLD: 0.2 %
INSULIN SERPL-ACNC: 14.1 MU/L (ref 3–25)
LDLC SERPL CALC-MCNC: 150 MG/DL (ref ?–100)
LYMPHOCYTES # BLD AUTO: 1.74 X10(3) UL (ref 1–4)
LYMPHOCYTES NFR BLD AUTO: 33.3 %
MCH RBC QN AUTO: 29.7 PG (ref 26–34)
MCHC RBC AUTO-ENTMCNC: 33.7 G/DL (ref 31–37)
MCV RBC AUTO: 88.1 FL
MONOCYTES # BLD AUTO: 0.35 X10(3) UL (ref 0.1–1)
MONOCYTES NFR BLD AUTO: 6.7 %
NEUTROPHILS # BLD AUTO: 2.99 X10 (3) UL (ref 1.5–7.7)
NEUTROPHILS # BLD AUTO: 2.99 X10(3) UL (ref 1.5–7.7)
NEUTROPHILS NFR BLD AUTO: 57.3 %
NONHDLC SERPL-MCNC: 167 MG/DL (ref ?–130)
OSMOLALITY SERPL CALC.SUM OF ELEC: 288 MOSM/KG (ref 275–295)
PLATELET # BLD AUTO: 267 10(3)UL (ref 150–450)
POTASSIUM SERPL-SCNC: 4.1 MMOL/L (ref 3.5–5.1)
PROT SERPL-MCNC: 7.8 G/DL (ref 5.7–8.2)
RBC # BLD AUTO: 4.81 X10(6)UL
SODIUM SERPL-SCNC: 140 MMOL/L (ref 136–145)
TRIGL SERPL-MCNC: 98 MG/DL (ref 30–149)
TSI SER-ACNC: 1.37 MIU/ML (ref 0.55–4.78)
VIT D+METAB SERPL-MCNC: 7.4 NG/ML (ref 30–100)
VLDLC SERPL CALC-MCNC: 18 MG/DL (ref 0–30)
WBC # BLD AUTO: 5.2 X10(3) UL (ref 4–11)

## 2024-07-13 PROCEDURE — 80061 LIPID PANEL: CPT

## 2024-07-13 PROCEDURE — 80053 COMPREHEN METABOLIC PANEL: CPT

## 2024-07-13 PROCEDURE — 82306 VITAMIN D 25 HYDROXY: CPT

## 2024-07-13 PROCEDURE — 36415 COLL VENOUS BLD VENIPUNCTURE: CPT

## 2024-07-13 PROCEDURE — 83036 HEMOGLOBIN GLYCOSYLATED A1C: CPT

## 2024-07-13 PROCEDURE — 84443 ASSAY THYROID STIM HORMONE: CPT

## 2024-07-13 PROCEDURE — 83525 ASSAY OF INSULIN: CPT

## 2024-07-13 PROCEDURE — 85025 COMPLETE CBC W/AUTO DIFF WBC: CPT

## 2024-07-13 NOTE — PROGRESS NOTES
Nai Graf is a 33 year old female.  Chief Complaint   Patient presents with    Physical     Annual physical     HPI:    Patient presented today for annual physical. She states that she has been trying to be healthy but is having a hard time. Has taken multivitamins, and apple cider vinegar to improve her health but nothing has helped her. Her periods have been irregular, but states getting acupuncture has helped. Usually gets period only once a year.   Hair loss, feels like balding, also has developed skin tags and and hyperpigmentation in skin folds,Facial hair. Feels emotionally down because of all these symptoms  Used to take metformin but thinks it did not help.     Current Outpatient Medications   Medication Sig Dispense Refill    metFORMIN 500 MG Oral Tab Take 1 tablet (500 mg total) by mouth daily with breakfast. 60 tablet 0      Past Medical History:    Depression      Past Surgical History:   Procedure Laterality Date    Appendectomy        Social History:  Social History     Socioeconomic History    Marital status: Single   Tobacco Use    Smoking status: Never     Passive exposure: Never    Smokeless tobacco: Never   Vaping Use    Vaping status: Never Used   Substance and Sexual Activity    Alcohol use: Yes     Comment: Socially    Drug use: Never    Sexual activity: Not Currently      Family History   Problem Relation Age of Onset    Hypertension Father     Ovarian Cancer Maternal Grandmother       No Known Allergies     REVIEW OF SYSTEMS:   Review of Systems   Review of Systems   Constitutional: Negative for activity change, appetite change and fever.   HENT: Negative for congestion and voice change.    Respiratory: Negative for cough and shortness of breath.    Cardiovascular: Negative for chest pain.   Gastrointestinal: Negative for abdominal distention, abdominal pain and vomiting.   Genitourinary: Negative for hematuria.   Skin: Negative for wound.   Psychiatric/Behavioral: Negative for  behavioral problems.   Wt Readings from Last 5 Encounters:   07/13/24 154 lb (69.9 kg)   04/21/22 147 lb (66.7 kg)   11/22/21 141 lb 12.8 oz (64.3 kg)   10/28/21 141 lb 12.8 oz (64.3 kg)   10/04/21 138 lb 1.6 oz (62.6 kg)     Body mass index is 27.28 kg/m².      EXAM:   /78 (BP Location: Left arm, Patient Position: Sitting, Cuff Size: adult)   Pulse 86   Temp 98.2 °F (36.8 °C)   Resp 16   Ht 5' 3\" (1.6 m)   Wt 154 lb (69.9 kg)   LMP 07/06/2024   SpO2 98%   BMI 27.28 kg/m²   Physical Exam   Constitutional:       Appearance: Normal appearance.   HENT:      Head: Normocephalic.   Eyes:      Conjunctiva/sclera: Conjunctivae normal.   Cardiovascular:      Rate and Rhythm: Normal rate and regular rhythm.      Heart sounds: Normal heart sounds. No murmur heard.  Pulmonary:      Effort: Pulmonary effort is normal.      Breath sounds: Normal breath sounds. No rhonchi or rales.   Abdominal:      General: Bowel sounds are normal.      Palpations: Abdomen is soft.      Tenderness: There is no abdominal tenderness.   Musculoskeletal:      Cervical back: Neck supple.      Right lower leg: No edema.      Left lower leg: No edema.   Skin:     General: Skin is warm and dry.   Neurological:      General: No focal deficit present.      Mental Status: He is alert and oriented to person, place, and time. Mental status is at baseline.   Psychiatric:         Mood and Affect: Mood normal.         Behavior: Behavior normal.       ASSESSMENT AND PLAN:   1. Annual physical exam  - check fasting blood work  - immunizations reviewed. Tdap given  - Patient to see gyn for pap and abnormal periods.   - Comp Metabolic Panel (14); Future  - Lipid Panel; Future  - TSH W Reflex To Free T4; Future  - Vitamin D; Future  - CBC With Differential With Platelet; Future    2. Insulin resistance  - will await lab results. She might need to restart metformin  - Insulin [E]; Future  - Hemoglobin A1C [E]; Future      The patient indicates  understanding of these issues and agrees to the plan.  Follow up in 6 months    Tameka Tran MD

## 2024-07-15 ENCOUNTER — TELEPHONE (OUTPATIENT)
Dept: INTERNAL MEDICINE CLINIC | Facility: CLINIC | Age: 34
End: 2024-07-15

## 2024-07-15 RX ORDER — ERGOCALCIFEROL 1.25 MG/1
50000 CAPSULE ORAL WEEKLY
Qty: 12 CAPSULE | Refills: 0 | Status: SHIPPED | OUTPATIENT
Start: 2024-07-15 | End: 2024-10-07

## 2024-07-15 RX ORDER — METFORMIN HYDROCHLORIDE 500 MG/1
500 TABLET, EXTENDED RELEASE ORAL DAILY
Qty: 90 TABLET | Refills: 0 | Status: SHIPPED | OUTPATIENT
Start: 2024-07-15

## 2024-10-12 ENCOUNTER — LAB ENCOUNTER (OUTPATIENT)
Dept: LAB | Age: 34
End: 2024-10-12
Attending: INTERNAL MEDICINE
Payer: COMMERCIAL

## 2024-10-12 ENCOUNTER — OFFICE VISIT (OUTPATIENT)
Dept: INTERNAL MEDICINE CLINIC | Facility: CLINIC | Age: 34
End: 2024-10-12

## 2024-10-12 VITALS
DIASTOLIC BLOOD PRESSURE: 80 MMHG | RESPIRATION RATE: 18 BRPM | BODY MASS INDEX: 27.11 KG/M2 | WEIGHT: 153 LBS | SYSTOLIC BLOOD PRESSURE: 116 MMHG | HEIGHT: 63 IN | HEART RATE: 94 BPM

## 2024-10-12 DIAGNOSIS — Z13.1 SCREENING FOR DIABETES MELLITUS: ICD-10-CM

## 2024-10-12 DIAGNOSIS — H92.01 RIGHT EAR PAIN: ICD-10-CM

## 2024-10-12 DIAGNOSIS — L65.9 HAIR LOSS: Primary | ICD-10-CM

## 2024-10-12 DIAGNOSIS — L65.9 HAIR LOSS: ICD-10-CM

## 2024-10-12 LAB
DEPRECATED HBV CORE AB SER IA-ACNC: 70 NG/ML
EST. AVERAGE GLUCOSE BLD GHB EST-MCNC: 105 MG/DL (ref 68–126)
HBA1C MFR BLD: 5.3 % (ref ?–5.7)
IRON SATN MFR SERPL: 28 %
IRON SERPL-MCNC: 108 UG/DL
TIBC SERPL-MCNC: 386 UG/DL (ref 250–425)
TRANSFERRIN SERPL-MCNC: 259 MG/DL (ref 250–380)

## 2024-10-12 PROCEDURE — 3008F BODY MASS INDEX DOCD: CPT | Performed by: INTERNAL MEDICINE

## 2024-10-12 PROCEDURE — 82728 ASSAY OF FERRITIN: CPT

## 2024-10-12 PROCEDURE — 3074F SYST BP LT 130 MM HG: CPT | Performed by: INTERNAL MEDICINE

## 2024-10-12 PROCEDURE — 90656 IIV3 VACC NO PRSV 0.5 ML IM: CPT | Performed by: INTERNAL MEDICINE

## 2024-10-12 PROCEDURE — 84466 ASSAY OF TRANSFERRIN: CPT

## 2024-10-12 PROCEDURE — 36415 COLL VENOUS BLD VENIPUNCTURE: CPT

## 2024-10-12 PROCEDURE — 83540 ASSAY OF IRON: CPT

## 2024-10-12 PROCEDURE — 3079F DIAST BP 80-89 MM HG: CPT | Performed by: INTERNAL MEDICINE

## 2024-10-12 PROCEDURE — 99213 OFFICE O/P EST LOW 20 MIN: CPT | Performed by: INTERNAL MEDICINE

## 2024-10-12 PROCEDURE — 90471 IMMUNIZATION ADMIN: CPT | Performed by: INTERNAL MEDICINE

## 2024-10-12 PROCEDURE — 83036 HEMOGLOBIN GLYCOSYLATED A1C: CPT

## 2024-10-12 NOTE — PROGRESS NOTES
Nai Graf is a 34 year old female.  Chief Complaint   Patient presents with    Follow - Up     Right ear pain x2 weeks      HPI:    Patient presented today for follow up. States she completed her 12 weeks of high dose vitamin D. She has also been taking metformin for insulin resistance, and would like to check labs. Struggling with her weight. Feels tired because has two jobs, very hard to find time for exercise. Has been trying to improve her diet.    Feels some discomfort in her Left ear. Had a cartilage piercing done couple weeks ago. Feels like it is improving. But the pain feels inside the ear. No discharge, no itching, no hearing changes.     Patient is also worried about hair loss. No improvement with vitamin D  Still has periods only once a year  Has been seen by gyn in the past for possible pcos.      Current Outpatient Medications   Medication Sig Dispense Refill    metFORMIN  MG Oral Tablet 24 Hr Take 1 tablet (500 mg total) by mouth daily. 90 tablet 0      Past Medical History:    Depression      Past Surgical History:   Procedure Laterality Date    Appendectomy        Social History:  Social History     Socioeconomic History    Marital status: Single   Tobacco Use    Smoking status: Never     Passive exposure: Never    Smokeless tobacco: Never   Vaping Use    Vaping status: Never Used   Substance and Sexual Activity    Alcohol use: Yes     Comment: Socially    Drug use: Never    Sexual activity: Not Currently      Family History   Problem Relation Age of Onset    Hypertension Father     Ovarian Cancer Maternal Grandmother       Allergies[1]     REVIEW OF SYSTEMS:   Review of Systems   Review of Systems   Constitutional: Negative for activity change, appetite change and fever.   HENT: Negative for congestion and voice change.    Respiratory: Negative for cough and shortness of breath.    Cardiovascular: Negative for chest pain.   Gastrointestinal: Negative for abdominal distention, abdominal  pain and vomiting.   Genitourinary: Negative for hematuria.   Skin: Negative for wound.   Psychiatric/Behavioral: Negative for behavioral problems.   Wt Readings from Last 5 Encounters:   10/12/24 153 lb (69.4 kg)   07/13/24 154 lb (69.9 kg)   04/21/22 147 lb (66.7 kg)   11/22/21 141 lb 12.8 oz (64.3 kg)   10/28/21 141 lb 12.8 oz (64.3 kg)     Body mass index is 27.1 kg/m².      EXAM:   /80   Pulse 94   Resp 18   Ht 5' 3\" (1.6 m)   Wt 153 lb (69.4 kg)   LMP 07/06/2024   BMI 27.10 kg/m²   Physical Exam   Constitutional:       Appearance: Normal appearance.   HENT:      Head: Normocephalic.   Bilateral ears normal. L ear with new piercing, healing well, no discharge, ear canal normal, no rash or discharge or discoloration  Eyes:      Conjunctiva/sclera: Conjunctivae normal.   Cardiovascular:      Rate and Rhythm: Normal rate and regular rhythm.      Heart sounds: Normal heart sounds. No murmur heard.  Pulmonary:      Effort: Pulmonary effort is normal.      Breath sounds: Normal breath sounds. No rhonchi or rales.   Abdominal:      General: Bowel sounds are normal.      Palpations: Abdomen is soft.      Tenderness: There is no abdominal tenderness.   Musculoskeletal:      Cervical back: Neck supple.      Right lower leg: No edema.      Left lower leg: No edema.   Skin:     General: Skin is warm and dry.   Neurological:      General: No focal deficit present.      Mental Status: He is alert and oriented to person, place, and time. Mental status is at baseline.   Psychiatric:         Mood and Affect: Mood normal.         Behavior: Behavior normal.       ASSESSMENT AND PLAN:   1. Hair loss  - start biotin   - Iron And Tibc [E]; Future  - Ferritin [E]; Future    2. Screening for diabetes mellitus  - continue metformin for insulin resistance  - Hemoglobin A1C [E]; Future    3. Right ear pain  - ear exam normal  - likely related to the new piercing  - healing well    The patient indicates understanding of these  issues and agrees to the plan.      Tameka Tran MD          [1] No Known Allergies

## 2024-10-18 ENCOUNTER — TELEPHONE (OUTPATIENT)
Dept: INTERNAL MEDICINE CLINIC | Facility: CLINIC | Age: 34
End: 2024-10-18

## 2024-10-18 NOTE — TELEPHONE ENCOUNTER
Patient called to request a refill on below medication. [facility] on file verified.       Medication Quantity Refills Start End   metFORMIN  MG Oral Tablet 24 Hr 90 tablet 0 7/15/2024 --   Sig:   Take 1 tablet (500 mg total) by mouth daily.     Route:   Oral     Order #:   [id number]

## 2024-10-18 NOTE — TELEPHONE ENCOUNTER
If prescription weekly vitamin D completed. Please start taking vitamin D3 1000 international units oral once a day.

## 2024-10-18 NOTE — TELEPHONE ENCOUNTER
Dr Tran ---- Asking what dose of Vitamin D is now recommended?   If over-the-counter - please indicate dosage and frequency for her to use.     Please respond by [employer] Message directly to the patient if no additional staff support is required.   Thanks!          Patient called office. Date of birth and full name both confirmed.   Asking what dose of Vitamin D is now recommended.     Chart and last Office Visit reviewed - no specifications.   Will inquire with provider.   She verbalizes understanding of all information, and agreeable to plan.

## 2024-10-21 RX ORDER — METFORMIN HYDROCHLORIDE 500 MG/1
500 TABLET, EXTENDED RELEASE ORAL DAILY
Qty: 90 TABLET | Refills: 0 | OUTPATIENT
Start: 2024-10-21

## 2024-10-21 RX ORDER — METFORMIN HYDROCHLORIDE 500 MG/1
500 TABLET, EXTENDED RELEASE ORAL DAILY
Qty: 90 TABLET | Refills: 3 | Status: SHIPPED | OUTPATIENT
Start: 2024-10-21

## 2024-10-21 NOTE — TELEPHONE ENCOUNTER
Please Review. Protocol Failed; No Protocol     Requested Prescriptions   Pending Prescriptions Disp Refills    metFORMIN  MG Oral Tablet 24 Hr 90 tablet 0     Sig: Take 1 tablet (500 mg total) by mouth daily.       Diabetes Medication Protocol Failed - 10/21/2024  3:11 PM        Failed - Microalbumin procedure in past 12 months or taking ACE/ARB        Passed - Last A1C < 7.5 and within past 6 months     Lab Results   Component Value Date    A1C 5.3 10/12/2024             Passed - In person appointment or virtual visit in the past 6 mos or appointment in next 3 mos     Recent Out[facility]its              [facility]    3 months [facility]    2 years ago [facility]-re[facility]    [facility]    2 years ago [facility]    Office Visit    2 years ago Vitamin D deficiency    [facility] [address], [place] Germaine Pelaez MD    Office Visit                      Passed - EGFRCR or GFRNAA > 50     GFR Evaluation  EGFRCR: 116 , resulted on 7/13/2024          Passed - GFR in the past 12 months                 Recent Outpatient Visits              [facility] week [facility]    [facility]    3 months ago [facility]    [facility]    2 years ago Acute non-recurrent [facility]illary sinusitis    [facility]    Office Visit    2 years ago Hypersomnia    [facility]    Office Visit    2 years ago Vitamin D deficiency     [facility] [address], [place] Germaine Fulton MD    Office Visit

## 2025-04-17 ENCOUNTER — OFFICE VISIT (OUTPATIENT)
Dept: OBGYN CLINIC | Facility: CLINIC | Age: 35
End: 2025-04-17

## 2025-04-17 ENCOUNTER — OFFICE VISIT (OUTPATIENT)
Dept: INTERNAL MEDICINE CLINIC | Facility: CLINIC | Age: 35
End: 2025-04-17
Payer: COMMERCIAL

## 2025-04-17 VITALS
HEIGHT: 63 IN | DIASTOLIC BLOOD PRESSURE: 84 MMHG | WEIGHT: 158 LBS | BODY MASS INDEX: 28 KG/M2 | SYSTOLIC BLOOD PRESSURE: 124 MMHG | HEART RATE: 99 BPM

## 2025-04-17 VITALS
HEART RATE: 114 BPM | HEIGHT: 63 IN | DIASTOLIC BLOOD PRESSURE: 86 MMHG | SYSTOLIC BLOOD PRESSURE: 131 MMHG | WEIGHT: 157 LBS | BODY MASS INDEX: 27.82 KG/M2

## 2025-04-17 DIAGNOSIS — E55.9 VITAMIN D DEFICIENCY: ICD-10-CM

## 2025-04-17 DIAGNOSIS — N91.5 HYPOMENORRHEA: ICD-10-CM

## 2025-04-17 DIAGNOSIS — Z01.419 ENCOUNTER FOR GYNECOLOGICAL EXAMINATION WITHOUT ABNORMAL FINDING: Primary | ICD-10-CM

## 2025-04-17 DIAGNOSIS — E66.3 OVERWEIGHT (BMI 25.0-29.9): ICD-10-CM

## 2025-04-17 DIAGNOSIS — E28.2 PCOS (POLYCYSTIC OVARIAN SYNDROME): ICD-10-CM

## 2025-04-17 DIAGNOSIS — Z00.00 ANNUAL PHYSICAL EXAM: Primary | ICD-10-CM

## 2025-04-17 DIAGNOSIS — E88.819 INSULIN RESISTANCE: ICD-10-CM

## 2025-04-17 PROCEDURE — 3008F BODY MASS INDEX DOCD: CPT | Performed by: OBSTETRICS & GYNECOLOGY

## 2025-04-17 PROCEDURE — 3079F DIAST BP 80-89 MM HG: CPT | Performed by: OBSTETRICS & GYNECOLOGY

## 2025-04-17 PROCEDURE — 99385 PREV VISIT NEW AGE 18-39: CPT | Performed by: OBSTETRICS & GYNECOLOGY

## 2025-04-17 PROCEDURE — 99213 OFFICE O/P EST LOW 20 MIN: CPT | Performed by: OBSTETRICS & GYNECOLOGY

## 2025-04-17 PROCEDURE — 3075F SYST BP GE 130 - 139MM HG: CPT | Performed by: OBSTETRICS & GYNECOLOGY

## 2025-04-17 NOTE — ASSESSMENT & PLAN NOTE
- we discussed birth control and depo shots.  - would like to stick with metformin  - increase dose to bid

## 2025-04-17 NOTE — PROGRESS NOTES
The following individual(s) verbally consented to be recorded using ambient AI listening technology and understand that they can each withdraw their consent to this listening technology at any point by asking the clinician to turn off or pause the recording:    Patient name: Nai Homar

## 2025-04-17 NOTE — PROGRESS NOTES
Nai Graf is a 34 year old female.  Chief Complaint   Patient presents with    Routine Physical     HPI:    Patient presented today for annual physical.    She states that she has been gaining weight.    She has not been able to keep up with her physical activity because of weather and busy at work.    She has been trying to monitor her diet.    Has stopped metformin for few weeks because went on a trip. Was not sure if save to restart   Her periods have been irregular, sometimes she is without period for lots of months, sometimes its very light.   She has not yet started biotin for hair loss.       Current Medications[1]   Past Medical History[2]   Past Surgical History[3]   Social History:  Short Social Hx on File[4]   Family History[5]   Allergies[6]     REVIEW OF SYSTEMS:   Review of Systems   Review of Systems   Constitutional: Negative for activity change, appetite change and fever.   HENT: Negative for congestion and voice change.    Respiratory: Negative for cough and shortness of breath.    Cardiovascular: Negative for chest pain.   Gastrointestinal: Negative for abdominal distention, abdominal pain and vomiting.   Genitourinary: Negative for hematuria.   Skin: Negative for wound.   Psychiatric/Behavioral: Negative for behavioral problems.   Wt Readings from Last 5 Encounters:   04/17/25 158 lb (71.7 kg)   04/17/25 157 lb (71.2 kg)   10/12/24 153 lb (69.4 kg)   07/13/24 154 lb (69.9 kg)   04/21/22 147 lb (66.7 kg)     Body mass index is 27.99 kg/m².      EXAM:   /84 (BP Location: Left arm, Patient Position: Sitting, Cuff Size: adult)   Pulse 99   Ht 5' 3\" (1.6 m)   Wt 158 lb (71.7 kg)   LMP 03/03/2025 (Approximate)   BMI 27.99 kg/m²   Physical Exam   Constitutional:       Appearance: Normal appearance.   HENT:      Head: Normocephalic.   Eyes:      Conjunctiva/sclera: Conjunctivae normal.   Breast:  Normal bilateral breast exam. No palpable masses or nodules.   No nipples asymmetry or  discharge. No skin changes   Cardiovascular:      Rate and Rhythm: Normal rate and regular rhythm.      Heart sounds: Normal heart sounds. No murmur heard.  Pulmonary:      Effort: Pulmonary effort is normal.      Breath sounds: Normal breath sounds. No rhonchi or rales.   Abdominal:      General: Bowel sounds are normal.      Palpations: Abdomen is soft.      Tenderness: There is no abdominal tenderness.   Musculoskeletal:      Cervical back: Neck supple.      Right lower leg: No edema.      Left lower leg: No edema.   Skin:     General: Skin is warm and dry.   Neurological:      General: No focal deficit present.      Mental Status: He is alert and oriented to person, place, and time. Mental status is at baseline.   Psychiatric:         Mood and Affect: Mood normal.         Behavior: Behavior normal.       ASSESSMENT AND PLAN:     Assessment & Plan  Annual physical exam  - check annual labs fasting  - immunizations reviewed  - general diet and exercise counseling   Orders:    Comp Metabolic Panel (14); Future    Lipid Panel; Future    TSH W Reflex To Free T4; Future    Vitamin D; Future    CBC With Differential With Platelet; Future    Insulin resistance  - restart metformin  - low carb diet discussed        Vitamin D deficiency  - rechecck levels   Orders:    Vitamin D; Future    Overweight (BMI 25.0-29.9)  - detailed discussion regarding lifestyle modifications and adding exercise as tolerated   Orders:    Cortisol [E]; Future    PCOS (polycystic ovarian syndrome)  - we discussed birth control and depo shots.  - would like to stick with metformin  - increase dose to bid         The patient indicates understanding of these issues and agrees to the plan.    Tameka Tran MD     This note was created by Dragon voice recognition. Errors in content may be related to improper recognition by the system; efforts to review and correct have been done but errors may still exist. Please be advised the primary purpose  of this note is for me to communicate medical care. Standard sentence structure is not always used. Medical terminology and medical abbreviations may be used. There may be grammatical, typographical, and automated fill ins that may have errors missed in proofreading.        [1]   Current Outpatient Medications   Medication Sig Dispense Refill    metFORMIN 500 MG Oral Tab Take 1 tablet (500 mg total) by mouth 2 (two) times daily with meals. 180 tablet 3   [2]   Past Medical History:   Depression   [3]   Past Surgical History:  Procedure Laterality Date    Appendectomy     [4]   Social History  Socioeconomic History    Marital status: Single   Tobacco Use    Smoking status: Never     Passive exposure: Never    Smokeless tobacco: Never   Vaping Use    Vaping status: Never Used   Substance and Sexual Activity    Alcohol use: Yes     Comment: Socially    Drug use: Never    Sexual activity: Never   [5]   Family History  Problem Relation Age of Onset    Hypertension Father     Ovarian Cancer Maternal Grandmother    [6] No Known Allergies

## 2025-04-18 LAB — HPV E6+E7 MRNA CVX QL NAA+PROBE: NEGATIVE

## 2025-04-19 ENCOUNTER — LAB ENCOUNTER (OUTPATIENT)
Dept: LAB | Age: 35
End: 2025-04-19
Attending: INTERNAL MEDICINE
Payer: COMMERCIAL

## 2025-04-19 DIAGNOSIS — Z00.00 ANNUAL PHYSICAL EXAM: ICD-10-CM

## 2025-04-19 DIAGNOSIS — E55.9 VITAMIN D DEFICIENCY: ICD-10-CM

## 2025-04-19 DIAGNOSIS — E66.3 OVERWEIGHT (BMI 25.0-29.9): ICD-10-CM

## 2025-04-19 LAB
ALBUMIN SERPL-MCNC: 4.9 G/DL (ref 3.2–4.8)
ALBUMIN/GLOB SERPL: 2 {RATIO} (ref 1–2)
ALP LIVER SERPL-CCNC: 97 U/L (ref 37–98)
ALT SERPL-CCNC: 39 U/L (ref 10–49)
ANION GAP SERPL CALC-SCNC: 8 MMOL/L (ref 0–18)
AST SERPL-CCNC: 20 U/L (ref ?–34)
BASOPHILS # BLD AUTO: 0.03 X10(3) UL (ref 0–0.2)
BASOPHILS NFR BLD AUTO: 0.6 %
BILIRUB SERPL-MCNC: 0.4 MG/DL (ref 0.3–1.2)
BUN BLD-MCNC: 8 MG/DL (ref 9–23)
BUN/CREAT SERPL: 12.5 (ref 10–20)
CALCIUM BLD-MCNC: 9.3 MG/DL (ref 8.7–10.4)
CHLORIDE SERPL-SCNC: 105 MMOL/L (ref 98–112)
CHOLEST SERPL-MCNC: 197 MG/DL (ref ?–200)
CO2 SERPL-SCNC: 27 MMOL/L (ref 21–32)
CORTIS SERPL-MCNC: 12.7 UG/DL
CREAT BLD-MCNC: 0.64 MG/DL (ref 0.55–1.02)
DEPRECATED RDW RBC AUTO: 40.4 FL (ref 35.1–46.3)
EGFRCR SERPLBLD CKD-EPI 2021: 118 ML/MIN/1.73M2 (ref 60–?)
EOSINOPHIL # BLD AUTO: 0.06 X10(3) UL (ref 0–0.7)
EOSINOPHIL NFR BLD AUTO: 1.1 %
ERYTHROCYTE [DISTWIDTH] IN BLOOD BY AUTOMATED COUNT: 12.8 % (ref 11–15)
FASTING PATIENT LIPID ANSWER: YES
FASTING STATUS PATIENT QL REPORTED: YES
GLOBULIN PLAS-MCNC: 2.5 G/DL (ref 2–3.5)
GLUCOSE BLD-MCNC: 98 MG/DL (ref 70–99)
HCT VFR BLD AUTO: 39.2 % (ref 35–48)
HDLC SERPL-MCNC: 61 MG/DL (ref 40–59)
HGB BLD-MCNC: 13.4 G/DL (ref 12–16)
IMM GRANULOCYTES # BLD AUTO: 0.01 X10(3) UL (ref 0–1)
IMM GRANULOCYTES NFR BLD: 0.2 %
LDLC SERPL CALC-MCNC: 120 MG/DL (ref ?–100)
LYMPHOCYTES # BLD AUTO: 1.88 X10(3) UL (ref 1–4)
LYMPHOCYTES NFR BLD AUTO: 35.7 %
MCH RBC QN AUTO: 29.5 PG (ref 26–34)
MCHC RBC AUTO-ENTMCNC: 34.2 G/DL (ref 31–37)
MCV RBC AUTO: 86.3 FL (ref 80–100)
MONOCYTES # BLD AUTO: 0.39 X10(3) UL (ref 0.1–1)
MONOCYTES NFR BLD AUTO: 7.4 %
NEUTROPHILS # BLD AUTO: 2.89 X10 (3) UL (ref 1.5–7.7)
NEUTROPHILS # BLD AUTO: 2.89 X10(3) UL (ref 1.5–7.7)
NEUTROPHILS NFR BLD AUTO: 55 %
NONHDLC SERPL-MCNC: 136 MG/DL (ref ?–130)
OSMOLALITY SERPL CALC.SUM OF ELEC: 288 MOSM/KG (ref 275–295)
PLATELET # BLD AUTO: 282 10(3)UL (ref 150–450)
POTASSIUM SERPL-SCNC: 3.9 MMOL/L (ref 3.5–5.1)
PROT SERPL-MCNC: 7.4 G/DL (ref 5.7–8.2)
RBC # BLD AUTO: 4.54 X10(6)UL (ref 3.8–5.3)
SODIUM SERPL-SCNC: 140 MMOL/L (ref 136–145)
TRIGL SERPL-MCNC: 87 MG/DL (ref 30–149)
TSI SER-ACNC: 1.39 UIU/ML (ref 0.55–4.78)
VIT D+METAB SERPL-MCNC: 19.1 NG/ML (ref 30–100)
VLDLC SERPL CALC-MCNC: 15 MG/DL (ref 0–30)
WBC # BLD AUTO: 5.3 X10(3) UL (ref 4–11)

## 2025-04-19 PROCEDURE — 80061 LIPID PANEL: CPT

## 2025-04-19 PROCEDURE — 80053 COMPREHEN METABOLIC PANEL: CPT

## 2025-04-19 PROCEDURE — 36415 COLL VENOUS BLD VENIPUNCTURE: CPT

## 2025-04-19 PROCEDURE — 82306 VITAMIN D 25 HYDROXY: CPT

## 2025-04-19 PROCEDURE — 85025 COMPLETE CBC W/AUTO DIFF WBC: CPT

## 2025-04-19 PROCEDURE — 84443 ASSAY THYROID STIM HORMONE: CPT

## 2025-04-19 PROCEDURE — 82533 TOTAL CORTISOL: CPT

## 2025-04-23 NOTE — PROGRESS NOTES
Nai Graf is a 34 year old female  Patient's last menstrual period was 2025 (approximate).   Chief Complaint   Patient presents with    Gyn Exam     Annual -- new pt    Menstrual Problem     patient has questions regarding irregular periods. Per pt she was diagnosed with PCOS in . Gets only one period a year since age 12. Was on ocps for one year when age 20 but stopped due to never active. Accupuncture caused monthly periods but too expensive. Currently placed on metformin 500 mg daily by PCP due to \"PCOS\" diagnosis & prediabetes -- stopped 2 weeks ago due to forgot to bring on vacation.      Vaginal Problem     PCP gave intravag gel for itching but never cultured. No Sx currently.   .  History of Present Illness      OBSTETRICS HISTORY:     OB History    Para Term  AB Living   0 0 0 0 0 0   SAB IAB Ectopic Multiple Live Births   0 0 0 0 0       GYNE HISTORY:     Periods  only once a year       BCM:  N/A    History   Sexual Activity    Sexual activity: Never                   Latest Ref Rng & Units 2025     4:46 PM   RECENT PAP RESULTS   INTERPRETATION/RESULT: Negative for intraepithelial lesion or malignancy Negative for intraepithelial lesion or malignancy    HPV Negative Negative          MEDICAL HISTORY:     Past Medical History:    Depression    High cholesterol     Past Surgical History:   Procedure Laterality Date    Appendectomy       OB History    Para Term  AB Living   0 0 0 0 0 0   SAB IAB Ectopic Multiple Live Births   0 0 0 0 0        SOCIAL HISTORY:     Tobacco Use: Low Risk  (2025)    Patient History     Smoking Tobacco Use: Never     Smokeless Tobacco Use: Never     Passive Exposure: Never       FAMILY HISTORY:     Family History   Problem Relation Age of Onset    Hypertension Father     Diabetes Maternal Grandmother     Cancer Maternal Grandmother 65        uterine or ovarian    Prostate Cancer Maternal Grandfather     Stroke  Paternal Grandmother          MEDICATIONS:       Current Outpatient Medications:     metFORMIN 500 MG Oral Tab, Take 1 tablet (500 mg total) by mouth 2 (two) times daily with meals., Disp: 180 tablet, Rfl: 3    ALLERGIES:     No Known Allergies      REVIEW OF SYSTEMS:     Constitutional:    denies fever / chills  Eyes:     denies blurred or double vision  Cardiovascular:  denies chest pain or palpitations  Respiratory:    denies shortness of breath  Gastrointestinal:  denies severe abdominal pain, frequent diarrhea or constipation, nausea / vomiting  Genitourinary:    denies dysuria, bothersome incontinence  Skin/Breast:   denies any breast pain, lumps, or discharge  Neurological:    denies frequent severe headaches  Psychiatric:   denies depression or anxiety, thoughts of harming self or others  Heme/Lymph:    denies easy bruising or bleeding      PHYSICAL EXAM:   Blood pressure 131/86, pulse 114, height 5' 3\" (1.6 m), weight 157 lb (71.2 kg), last menstrual period 03/03/2025.  Constitutional:  well developed, well nourished  Head/Face:  normocephalic  Neck/Thyroid: thyroid symmetric, no thyromegaly, no nodules, no adenopathy  Lymphatic: no abnormal supraclavicular or axillary adenopathy is noted  Breast:   normal without palpable masses, tenderness, asymmetry, nipple discharge, nipple retraction or skin changes  Abdomen:   soft, nontender, nondistended, no masses  Skin/Hair:  no unusual rashes or bruises  Extremities:  no edema, no cyanosis  Psychiatric:   oriented to time, place, person and situation. Appropriate mood and affect    Pelvic Exam:  External Genitalia:  normal appearance, hair distribution, and no lesions  Urethral Meatus:   normal in size, location, without lesions and prolapse  Bladder:    no fullness, masses or tenderness  Vagina:    normal appearance without lesions, no abnormal discharge  Cervix:    normal without tenderness on motion  Uterus:    normal in size, contour, position, mobility,  without tenderness  Adnexa:   normal without masses or tenderness  Perineum:   normal  Anus:    no hemorroids     Results      ASSESSMENT & PLAN:     Nai was seen today for gyn exam, menstrual problem and vaginal problem.    Diagnoses and all orders for this visit:    Encounter for gynecological examination without abnormal finding  -     ThinPrep PAP Smear  -     Hpv Dna  High Risk , Thin Prep Collect  -     THINPREP PAP SMEAR ONLY    Hypomenorrhea    Reviewed importance to have period once every 3 months to avoid increased hyperplasia / uterine cancer risk. Pt never active thus if no period by mid June, needs urine pregnancy test w/ provera 10 mg po x 5 days to wash out any lining within. Could consider using ocps to give regular cycles if wishes.   Metformin at 500 mg dosing will not initiate ovulation -- that would need to be at 1500 mg dosing.  PCP must be using more for prediabetic state. Pt not trying to get pregnant thus will have pt call every 3 months w/o period if declines ocps.  Weight loss can restart periods also sometimes.  PCOS is based on Sx of obesity / irregular periods / hirsutism. Reviewed pathophys in detail.  Pt never has had ultrasound.  Annual gyne exam  Needs cultures if vaginal itching occur. Pt has never been cultured.  Assessment & Plan      SUMMARY:  Pap: Next cotest today per [facility]P guidelines.  BCM:  N/A  STD screening: No  Mammogram: n/a -- once 39 yrs old  HM updated  Depression screen:   Depression Screening (PHQ-2/PHQ-9): Over the LAST 2 WEEKS   Little interest or pleasure in doing things (over the last two weeks)?: Several days    Feeling down, depressed, or hopeless (over the last two weeks)?: Not at all    PHQ-2 SCORE: 1          FOLLOW-UP     Return in about 1 year (around 4/17/2026) for annual gyne exam.    Note to patient and family:  The 21st Century Cures Act makes medical notes available to patients in the interest of transparency.  However, please be advised that this  is a medical document.  It is intended as a peer to peer communication.  It is written in medical language and may contain abbreviations or verbiage that are technical and unfamiliar.  It may appear blunt or direct.  Medical documents are intended to carry relevant information, facts as evident, and the clinical opinion of the practitioner.

## (undated) NOTE — LETTER
4/21/2022          To Whom It May Concern:    Mansi Aguilar is currently under my medical care and may not return to work at this time. She may return to work on 04/25/2022. Activity is restricted as follows: none. If you require additional information please contact our office.         Sincerely,    Leon Benedict PA-C          Document generated by:  Leon Benedict PA-C